# Patient Record
Sex: MALE | Race: BLACK OR AFRICAN AMERICAN | NOT HISPANIC OR LATINO
[De-identification: names, ages, dates, MRNs, and addresses within clinical notes are randomized per-mention and may not be internally consistent; named-entity substitution may affect disease eponyms.]

---

## 2017-01-03 PROBLEM — Z00.00 ENCOUNTER FOR PREVENTIVE HEALTH EXAMINATION: Status: ACTIVE | Noted: 2017-01-03

## 2017-02-06 ENCOUNTER — APPOINTMENT (OUTPATIENT)
Dept: OPHTHALMOLOGY | Facility: CLINIC | Age: 52
End: 2017-02-06

## 2017-02-06 DIAGNOSIS — H20.043 SECONDARY NONINFECTIOUS IRIDOCYCLITIS, BILATERAL: ICD-10-CM

## 2017-02-06 DIAGNOSIS — D86.89 SARCOIDOSIS OF OTHER SITES: ICD-10-CM

## 2017-11-13 ENCOUNTER — APPOINTMENT (OUTPATIENT)
Dept: OPHTHALMOLOGY | Facility: CLINIC | Age: 52
End: 2017-11-13
Payer: COMMERCIAL

## 2017-11-13 PROCEDURE — 92014 COMPRE OPH EXAM EST PT 1/>: CPT

## 2017-11-13 PROCEDURE — 76512 OPH US DX B-SCAN: CPT | Mod: RT

## 2017-11-13 PROCEDURE — 92134 CPTRZ OPH DX IMG PST SGM RTA: CPT

## 2017-11-13 RX ORDER — PREDNISOLONE ACETATE 10 MG/ML
1 SUSPENSION/ DROPS OPHTHALMIC
Qty: 1 | Refills: 6 | Status: ACTIVE | COMMUNITY
Start: 2017-11-13 | End: 1900-01-01

## 2017-11-22 ENCOUNTER — APPOINTMENT (OUTPATIENT)
Dept: OPHTHALMOLOGY | Facility: CLINIC | Age: 52
End: 2017-11-22
Payer: COMMERCIAL

## 2017-11-22 PROCEDURE — 76514 ECHO EXAM OF EYE THICKNESS: CPT

## 2017-11-22 PROCEDURE — 92012 INTRM OPH EXAM EST PATIENT: CPT

## 2017-11-22 RX ORDER — DORZOLAMIDE HYDROCHLORIDE TIMOLOL MALEATE 20; 5 MG/ML; MG/ML
22.3-6.8 SOLUTION/ DROPS OPHTHALMIC
Qty: 3 | Refills: 6 | Status: ACTIVE | COMMUNITY
Start: 2017-11-22 | End: 1900-01-01

## 2017-12-11 ENCOUNTER — APPOINTMENT (OUTPATIENT)
Dept: OPHTHALMOLOGY | Facility: CLINIC | Age: 52
End: 2017-12-11
Payer: COMMERCIAL

## 2017-12-11 PROCEDURE — 92012 INTRM OPH EXAM EST PATIENT: CPT

## 2018-02-12 ENCOUNTER — APPOINTMENT (OUTPATIENT)
Dept: OPHTHALMOLOGY | Facility: CLINIC | Age: 53
End: 2018-02-12

## 2018-04-30 ENCOUNTER — APPOINTMENT (OUTPATIENT)
Dept: OPHTHALMOLOGY | Facility: CLINIC | Age: 53
End: 2018-04-30
Payer: COMMERCIAL

## 2018-04-30 PROCEDURE — 92014 COMPRE OPH EXAM EST PT 1/>: CPT

## 2018-04-30 PROCEDURE — 92133 CPTRZD OPH DX IMG PST SGM ON: CPT

## 2018-04-30 RX ORDER — PREDNISOLONE ACETATE 10 MG/ML
1 SUSPENSION/ DROPS OPHTHALMIC
Qty: 1 | Refills: 4 | Status: ACTIVE | COMMUNITY
Start: 2018-04-30 | End: 1900-01-01

## 2018-05-08 ENCOUNTER — MEDICATION RENEWAL (OUTPATIENT)
Age: 53
End: 2018-05-08

## 2018-06-28 ENCOUNTER — RX RENEWAL (OUTPATIENT)
Age: 53
End: 2018-06-28

## 2018-08-13 ENCOUNTER — MEDICATION RENEWAL (OUTPATIENT)
Age: 53
End: 2018-08-13

## 2018-08-13 RX ORDER — PREDNISOLONE ACETATE 10 MG/ML
1 SUSPENSION/ DROPS OPHTHALMIC
Qty: 1 | Refills: 0 | Status: ACTIVE | COMMUNITY
Start: 2017-12-11 | End: 1900-01-01

## 2018-08-17 ENCOUNTER — APPOINTMENT (OUTPATIENT)
Dept: OPHTHALMOLOGY | Facility: CLINIC | Age: 53
End: 2018-08-17
Payer: COMMERCIAL

## 2018-08-17 DIAGNOSIS — H40.43X2 GLAUCOMA SECONDARY TO EYE INFLAMMATION, BILATERAL, MODERATE STAGE: ICD-10-CM

## 2018-08-17 PROCEDURE — 92134 CPTRZ OPH DX IMG PST SGM RTA: CPT

## 2018-08-17 PROCEDURE — 92014 COMPRE OPH EXAM EST PT 1/>: CPT

## 2018-08-17 PROCEDURE — 92083 EXTENDED VISUAL FIELD XM: CPT

## 2018-12-03 ENCOUNTER — RX RENEWAL (OUTPATIENT)
Age: 53
End: 2018-12-03

## 2018-12-07 ENCOUNTER — RX RENEWAL (OUTPATIENT)
Age: 53
End: 2018-12-07

## 2018-12-07 RX ORDER — BRIMONIDINE TARTRATE 1.5 MG/ML
0.15 SOLUTION/ DROPS OPHTHALMIC
Qty: 20 | Refills: 6 | Status: ACTIVE | COMMUNITY
Start: 2018-04-30 | End: 1900-01-01

## 2018-12-07 RX ORDER — BRIMONIDINE TARTRATE 1.5 MG/ML
0.15 SOLUTION/ DROPS OPHTHALMIC
Qty: 10 | Refills: 0 | Status: ACTIVE | COMMUNITY
Start: 2017-11-13 | End: 1900-01-01

## 2019-04-08 ENCOUNTER — APPOINTMENT (OUTPATIENT)
Dept: OPHTHALMOLOGY | Facility: CLINIC | Age: 54
End: 2019-04-08
Payer: COMMERCIAL

## 2019-04-08 DIAGNOSIS — H43.811 VITREOUS DEGENERATION, RIGHT EYE: ICD-10-CM

## 2019-04-08 DIAGNOSIS — D86.9 SARCOIDOSIS, UNSPECIFIED: ICD-10-CM

## 2019-04-08 DIAGNOSIS — H20.13 CHRONIC IRIDOCYCLITIS, BILATERAL: ICD-10-CM

## 2019-04-08 DIAGNOSIS — H21.543 POSTERIOR SYNECHIAE (IRIS), BILATERAL: ICD-10-CM

## 2019-04-08 DIAGNOSIS — H26.9 UNSPECIFIED CATARACT: ICD-10-CM

## 2019-04-08 PROCEDURE — 92134 CPTRZ OPH DX IMG PST SGM RTA: CPT

## 2019-04-08 PROCEDURE — 92012 INTRM OPH EXAM EST PATIENT: CPT

## 2019-04-08 RX ORDER — PREDNISOLONE ACETATE 10 MG/ML
1 SUSPENSION/ DROPS OPHTHALMIC
Qty: 1 | Refills: 3 | Status: ACTIVE | COMMUNITY
Start: 2019-04-08 | End: 1900-01-01

## 2019-04-14 ENCOUNTER — RX RENEWAL (OUTPATIENT)
Age: 54
End: 2019-04-14

## 2019-04-14 RX ORDER — DORZOLAMIDE HYDROCHLORIDE TIMOLOL MALEATE 20; 5 MG/ML; MG/ML
22.3-6.8 SOLUTION/ DROPS OPHTHALMIC
Qty: 1 | Refills: 5 | Status: ACTIVE | COMMUNITY
Start: 2018-04-30 | End: 1900-01-01

## 2019-11-13 ENCOUNTER — APPOINTMENT (OUTPATIENT)
Dept: OPHTHALMOLOGY | Facility: CLINIC | Age: 54
End: 2019-11-13
Payer: COMMERCIAL

## 2019-11-13 ENCOUNTER — NON-APPOINTMENT (OUTPATIENT)
Age: 54
End: 2019-11-13

## 2019-11-13 PROCEDURE — 92134 CPTRZ OPH DX IMG PST SGM RTA: CPT

## 2019-11-13 PROCEDURE — 92014 COMPRE OPH EXAM EST PT 1/>: CPT

## 2019-11-13 PROCEDURE — 92136 OPHTHALMIC BIOMETRY: CPT | Mod: RT

## 2020-01-02 ENCOUNTER — APPOINTMENT (OUTPATIENT)
Dept: OPHTHALMOLOGY | Facility: CLINIC | Age: 55
End: 2020-01-02
Payer: COMMERCIAL

## 2020-01-02 ENCOUNTER — NON-APPOINTMENT (OUTPATIENT)
Age: 55
End: 2020-01-02

## 2020-01-02 PROCEDURE — 92014 COMPRE OPH EXAM EST PT 1/>: CPT

## 2021-02-10 ENCOUNTER — APPOINTMENT (OUTPATIENT)
Dept: OPHTHALMOLOGY | Facility: CLINIC | Age: 56
End: 2021-02-10
Payer: COMMERCIAL

## 2021-02-10 ENCOUNTER — NON-APPOINTMENT (OUTPATIENT)
Age: 56
End: 2021-02-10

## 2021-02-10 PROCEDURE — 92014 COMPRE OPH EXAM EST PT 1/>: CPT

## 2021-02-10 PROCEDURE — 99072 ADDL SUPL MATRL&STAF TM PHE: CPT

## 2021-02-10 PROCEDURE — 92133 CPTRZD OPH DX IMG PST SGM ON: CPT

## 2021-02-11 ENCOUNTER — NON-APPOINTMENT (OUTPATIENT)
Age: 56
End: 2021-02-11

## 2021-02-11 ENCOUNTER — APPOINTMENT (OUTPATIENT)
Dept: OPHTHALMOLOGY | Facility: CLINIC | Age: 56
End: 2021-02-11
Payer: COMMERCIAL

## 2021-02-11 PROCEDURE — 92083 EXTENDED VISUAL FIELD XM: CPT

## 2021-02-11 PROCEDURE — 92020 GONIOSCOPY: CPT

## 2021-02-11 PROCEDURE — 92012 INTRM OPH EXAM EST PATIENT: CPT

## 2021-02-11 PROCEDURE — 99072 ADDL SUPL MATRL&STAF TM PHE: CPT

## 2021-02-17 ENCOUNTER — NON-APPOINTMENT (OUTPATIENT)
Age: 56
End: 2021-02-17

## 2021-02-17 ENCOUNTER — APPOINTMENT (OUTPATIENT)
Dept: OPHTHALMOLOGY | Facility: CLINIC | Age: 56
End: 2021-02-17
Payer: COMMERCIAL

## 2021-02-17 PROCEDURE — 66761 REVISION OF IRIS: CPT | Mod: RT

## 2021-02-17 PROCEDURE — 99072 ADDL SUPL MATRL&STAF TM PHE: CPT

## 2021-02-18 ENCOUNTER — APPOINTMENT (OUTPATIENT)
Dept: OPHTHALMOLOGY | Facility: CLINIC | Age: 56
End: 2021-02-18
Payer: COMMERCIAL

## 2021-02-18 ENCOUNTER — NON-APPOINTMENT (OUTPATIENT)
Age: 56
End: 2021-02-18

## 2021-02-18 PROCEDURE — 92014 COMPRE OPH EXAM EST PT 1/>: CPT | Mod: 24

## 2021-02-18 PROCEDURE — 76512 OPH US DX B-SCAN: CPT | Mod: RT

## 2021-02-18 PROCEDURE — 92134 CPTRZ OPH DX IMG PST SGM RTA: CPT

## 2021-02-18 PROCEDURE — 99072 ADDL SUPL MATRL&STAF TM PHE: CPT

## 2021-02-24 ENCOUNTER — NON-APPOINTMENT (OUTPATIENT)
Age: 56
End: 2021-02-24

## 2021-02-24 ENCOUNTER — APPOINTMENT (OUTPATIENT)
Dept: OPHTHALMOLOGY | Facility: CLINIC | Age: 56
End: 2021-02-24
Payer: COMMERCIAL

## 2021-02-24 PROCEDURE — 99072 ADDL SUPL MATRL&STAF TM PHE: CPT

## 2021-02-24 PROCEDURE — 92012 INTRM OPH EXAM EST PATIENT: CPT | Mod: 24

## 2021-03-03 ENCOUNTER — APPOINTMENT (OUTPATIENT)
Dept: OPHTHALMOLOGY | Facility: CLINIC | Age: 56
End: 2021-03-03
Payer: COMMERCIAL

## 2021-03-03 ENCOUNTER — NON-APPOINTMENT (OUTPATIENT)
Age: 56
End: 2021-03-03

## 2021-03-03 PROCEDURE — 92012 INTRM OPH EXAM EST PATIENT: CPT

## 2021-03-03 PROCEDURE — 99072 ADDL SUPL MATRL&STAF TM PHE: CPT

## 2021-03-08 DIAGNOSIS — Z01.818 ENCOUNTER FOR OTHER PREPROCEDURAL EXAMINATION: ICD-10-CM

## 2021-03-11 ENCOUNTER — APPOINTMENT (OUTPATIENT)
Dept: DISASTER EMERGENCY | Facility: CLINIC | Age: 56
End: 2021-03-11

## 2021-03-12 LAB — SARS-COV-2 N GENE NPH QL NAA+PROBE: NOT DETECTED

## 2021-03-15 ENCOUNTER — APPOINTMENT (OUTPATIENT)
Dept: OPHTHALMOLOGY | Facility: AMBULATORY SURGERY CENTER | Age: 56
End: 2021-03-15
Payer: COMMERCIAL

## 2021-03-15 ENCOUNTER — NON-APPOINTMENT (OUTPATIENT)
Age: 56
End: 2021-03-15

## 2021-03-15 PROCEDURE — 66982 XCAPSL CTRC RMVL CPLX WO ECP: CPT | Mod: LT

## 2021-03-15 PROCEDURE — 66180 AQUEOUS SHUNT EYE W/GRAFT: CPT | Mod: LT

## 2021-03-16 ENCOUNTER — APPOINTMENT (OUTPATIENT)
Dept: OPHTHALMOLOGY | Facility: CLINIC | Age: 56
End: 2021-03-16
Payer: COMMERCIAL

## 2021-03-16 ENCOUNTER — NON-APPOINTMENT (OUTPATIENT)
Age: 56
End: 2021-03-16

## 2021-03-16 PROCEDURE — 99024 POSTOP FOLLOW-UP VISIT: CPT

## 2021-03-18 ENCOUNTER — NON-APPOINTMENT (OUTPATIENT)
Age: 56
End: 2021-03-18

## 2021-03-18 ENCOUNTER — APPOINTMENT (OUTPATIENT)
Dept: OPHTHALMOLOGY | Facility: CLINIC | Age: 56
End: 2021-03-18
Payer: COMMERCIAL

## 2021-03-18 PROCEDURE — 99024 POSTOP FOLLOW-UP VISIT: CPT

## 2021-03-23 ENCOUNTER — NON-APPOINTMENT (OUTPATIENT)
Age: 56
End: 2021-03-23

## 2021-03-23 ENCOUNTER — APPOINTMENT (OUTPATIENT)
Dept: OPHTHALMOLOGY | Facility: CLINIC | Age: 56
End: 2021-03-23
Payer: COMMERCIAL

## 2021-03-23 PROCEDURE — 99024 POSTOP FOLLOW-UP VISIT: CPT

## 2021-04-01 ENCOUNTER — NON-APPOINTMENT (OUTPATIENT)
Age: 56
End: 2021-04-01

## 2021-04-01 ENCOUNTER — APPOINTMENT (OUTPATIENT)
Dept: OPHTHALMOLOGY | Facility: CLINIC | Age: 56
End: 2021-04-01
Payer: COMMERCIAL

## 2021-04-01 PROCEDURE — 99024 POSTOP FOLLOW-UP VISIT: CPT

## 2021-04-08 ENCOUNTER — APPOINTMENT (OUTPATIENT)
Dept: OPHTHALMOLOGY | Facility: CLINIC | Age: 56
End: 2021-04-08
Payer: COMMERCIAL

## 2021-04-08 ENCOUNTER — NON-APPOINTMENT (OUTPATIENT)
Age: 56
End: 2021-04-08

## 2021-04-08 PROCEDURE — 99024 POSTOP FOLLOW-UP VISIT: CPT

## 2021-04-15 ENCOUNTER — APPOINTMENT (OUTPATIENT)
Dept: OPHTHALMOLOGY | Facility: CLINIC | Age: 56
End: 2021-04-15
Payer: COMMERCIAL

## 2021-04-15 ENCOUNTER — NON-APPOINTMENT (OUTPATIENT)
Age: 56
End: 2021-04-15

## 2021-04-15 PROCEDURE — 92134 CPTRZ OPH DX IMG PST SGM RTA: CPT

## 2021-04-15 PROCEDURE — 99024 POSTOP FOLLOW-UP VISIT: CPT

## 2021-04-21 ENCOUNTER — APPOINTMENT (OUTPATIENT)
Dept: OPHTHALMOLOGY | Facility: CLINIC | Age: 56
End: 2021-04-21
Payer: COMMERCIAL

## 2021-04-21 ENCOUNTER — NON-APPOINTMENT (OUTPATIENT)
Age: 56
End: 2021-04-21

## 2021-04-21 PROCEDURE — 76512 OPH US DX B-SCAN: CPT | Mod: RT

## 2021-04-21 PROCEDURE — 99024 POSTOP FOLLOW-UP VISIT: CPT

## 2021-04-23 ENCOUNTER — NON-APPOINTMENT (OUTPATIENT)
Age: 56
End: 2021-04-23

## 2021-04-23 ENCOUNTER — APPOINTMENT (OUTPATIENT)
Dept: OPHTHALMOLOGY | Facility: CLINIC | Age: 56
End: 2021-04-23
Payer: COMMERCIAL

## 2021-04-23 PROCEDURE — 99024 POSTOP FOLLOW-UP VISIT: CPT

## 2021-04-23 PROCEDURE — 76512 OPH US DX B-SCAN: CPT | Mod: LT

## 2021-04-24 ENCOUNTER — NON-APPOINTMENT (OUTPATIENT)
Age: 56
End: 2021-04-24

## 2021-04-24 ENCOUNTER — APPOINTMENT (OUTPATIENT)
Dept: OPHTHALMOLOGY | Facility: CLINIC | Age: 56
End: 2021-04-24
Payer: COMMERCIAL

## 2021-04-24 PROCEDURE — 99024 POSTOP FOLLOW-UP VISIT: CPT

## 2021-04-26 ENCOUNTER — APPOINTMENT (OUTPATIENT)
Dept: OPHTHALMOLOGY | Facility: CLINIC | Age: 56
End: 2021-04-26

## 2021-04-27 ENCOUNTER — NON-APPOINTMENT (OUTPATIENT)
Age: 56
End: 2021-04-27

## 2021-04-27 ENCOUNTER — APPOINTMENT (OUTPATIENT)
Dept: OPHTHALMOLOGY | Facility: CLINIC | Age: 56
End: 2021-04-27
Payer: COMMERCIAL

## 2021-04-27 PROCEDURE — 92134 CPTRZ OPH DX IMG PST SGM RTA: CPT

## 2021-04-27 PROCEDURE — 66030 INJECTION TREATMENT OF EYE: CPT | Mod: LT,58

## 2021-04-27 PROCEDURE — 99024 POSTOP FOLLOW-UP VISIT: CPT

## 2021-04-29 ENCOUNTER — APPOINTMENT (OUTPATIENT)
Dept: OPHTHALMOLOGY | Facility: CLINIC | Age: 56
End: 2021-04-29
Payer: COMMERCIAL

## 2021-04-29 ENCOUNTER — APPOINTMENT (OUTPATIENT)
Dept: OPHTHALMOLOGY | Facility: CLINIC | Age: 56
End: 2021-04-29

## 2021-04-29 ENCOUNTER — NON-APPOINTMENT (OUTPATIENT)
Age: 56
End: 2021-04-29

## 2021-04-29 ENCOUNTER — APPOINTMENT (OUTPATIENT)
Dept: DISASTER EMERGENCY | Facility: CLINIC | Age: 56
End: 2021-04-29

## 2021-04-29 PROCEDURE — 99024 POSTOP FOLLOW-UP VISIT: CPT

## 2021-04-30 LAB — SARS-COV-2 N GENE NPH QL NAA+PROBE: NOT DETECTED

## 2021-05-03 ENCOUNTER — APPOINTMENT (OUTPATIENT)
Dept: OPHTHALMOLOGY | Facility: AMBULATORY SURGERY CENTER | Age: 56
End: 2021-05-03
Payer: COMMERCIAL

## 2021-05-03 PROCEDURE — 66185 REVISE AQUEOUS SHUNT EYE: CPT | Mod: LT,79

## 2021-05-04 ENCOUNTER — APPOINTMENT (OUTPATIENT)
Dept: OPHTHALMOLOGY | Facility: CLINIC | Age: 56
End: 2021-05-04
Payer: COMMERCIAL

## 2021-05-04 ENCOUNTER — NON-APPOINTMENT (OUTPATIENT)
Age: 56
End: 2021-05-04

## 2021-05-04 PROCEDURE — 66030 INJECTION TREATMENT OF EYE: CPT | Mod: 58,LT

## 2021-05-04 PROCEDURE — 99024 POSTOP FOLLOW-UP VISIT: CPT

## 2021-05-06 ENCOUNTER — NON-APPOINTMENT (OUTPATIENT)
Age: 56
End: 2021-05-06

## 2021-05-06 ENCOUNTER — APPOINTMENT (OUTPATIENT)
Dept: OPHTHALMOLOGY | Facility: CLINIC | Age: 56
End: 2021-05-06
Payer: COMMERCIAL

## 2021-05-06 PROCEDURE — 76512 OPH US DX B-SCAN: CPT | Mod: LT

## 2021-05-06 PROCEDURE — 66030 INJECTION TREATMENT OF EYE: CPT | Mod: 58,LT

## 2021-05-06 PROCEDURE — 99024 POSTOP FOLLOW-UP VISIT: CPT

## 2021-05-11 ENCOUNTER — APPOINTMENT (OUTPATIENT)
Dept: OPHTHALMOLOGY | Facility: CLINIC | Age: 56
End: 2021-05-11
Payer: COMMERCIAL

## 2021-05-11 ENCOUNTER — NON-APPOINTMENT (OUTPATIENT)
Age: 56
End: 2021-05-11

## 2021-05-11 PROCEDURE — 99024 POSTOP FOLLOW-UP VISIT: CPT

## 2021-05-11 PROCEDURE — 66030 INJECTION TREATMENT OF EYE: CPT | Mod: 58,LT

## 2021-05-11 PROCEDURE — 76512 OPH US DX B-SCAN: CPT | Mod: LT

## 2021-05-13 ENCOUNTER — APPOINTMENT (OUTPATIENT)
Dept: OPHTHALMOLOGY | Facility: CLINIC | Age: 56
End: 2021-05-13
Payer: COMMERCIAL

## 2021-05-13 ENCOUNTER — NON-APPOINTMENT (OUTPATIENT)
Age: 56
End: 2021-05-13

## 2021-05-13 PROCEDURE — 99024 POSTOP FOLLOW-UP VISIT: CPT

## 2021-05-17 ENCOUNTER — APPOINTMENT (OUTPATIENT)
Dept: OPHTHALMOLOGY | Facility: CLINIC | Age: 56
End: 2021-05-17
Payer: COMMERCIAL

## 2021-05-17 ENCOUNTER — NON-APPOINTMENT (OUTPATIENT)
Age: 56
End: 2021-05-17

## 2021-05-17 PROCEDURE — 99024 POSTOP FOLLOW-UP VISIT: CPT

## 2021-05-19 ENCOUNTER — NON-APPOINTMENT (OUTPATIENT)
Age: 56
End: 2021-05-19

## 2021-05-19 ENCOUNTER — APPOINTMENT (OUTPATIENT)
Dept: OPHTHALMOLOGY | Facility: CLINIC | Age: 56
End: 2021-05-19
Payer: COMMERCIAL

## 2021-05-19 PROCEDURE — 99024 POSTOP FOLLOW-UP VISIT: CPT

## 2021-05-20 DIAGNOSIS — Z01.818 ENCOUNTER FOR OTHER PREPROCEDURAL EXAMINATION: ICD-10-CM

## 2021-05-21 ENCOUNTER — APPOINTMENT (OUTPATIENT)
Dept: DISASTER EMERGENCY | Facility: CLINIC | Age: 56
End: 2021-05-21

## 2021-05-22 LAB — SARS-COV-2 N GENE NPH QL NAA+PROBE: NOT DETECTED

## 2021-05-24 ENCOUNTER — APPOINTMENT (OUTPATIENT)
Dept: OPHTHALMOLOGY | Facility: CLINIC | Age: 56
End: 2021-05-24
Payer: COMMERCIAL

## 2021-05-24 ENCOUNTER — NON-APPOINTMENT (OUTPATIENT)
Age: 56
End: 2021-05-24

## 2021-05-24 PROCEDURE — 92134 CPTRZ OPH DX IMG PST SGM RTA: CPT

## 2021-05-24 PROCEDURE — 99072 ADDL SUPL MATRL&STAF TM PHE: CPT

## 2021-05-24 PROCEDURE — 92012 INTRM OPH EXAM EST PATIENT: CPT | Mod: 24

## 2021-05-26 ENCOUNTER — APPOINTMENT (OUTPATIENT)
Dept: OPHTHALMOLOGY | Facility: AMBULATORY SURGERY CENTER | Age: 56
End: 2021-05-26
Payer: COMMERCIAL

## 2021-05-26 PROCEDURE — 66185 REVISE AQUEOUS SHUNT EYE: CPT | Mod: LT,79

## 2021-05-27 ENCOUNTER — APPOINTMENT (OUTPATIENT)
Dept: OPHTHALMOLOGY | Facility: CLINIC | Age: 56
End: 2021-05-27
Payer: COMMERCIAL

## 2021-05-27 ENCOUNTER — NON-APPOINTMENT (OUTPATIENT)
Age: 56
End: 2021-05-27

## 2021-05-27 PROCEDURE — 76512 OPH US DX B-SCAN: CPT | Mod: RT

## 2021-05-27 PROCEDURE — 99024 POSTOP FOLLOW-UP VISIT: CPT

## 2021-06-03 ENCOUNTER — APPOINTMENT (OUTPATIENT)
Dept: OPHTHALMOLOGY | Facility: CLINIC | Age: 56
End: 2021-06-03
Payer: COMMERCIAL

## 2021-06-03 ENCOUNTER — NON-APPOINTMENT (OUTPATIENT)
Age: 56
End: 2021-06-03

## 2021-06-03 PROCEDURE — 76512 OPH US DX B-SCAN: CPT | Mod: LT

## 2021-06-03 PROCEDURE — 99024 POSTOP FOLLOW-UP VISIT: CPT

## 2021-06-17 ENCOUNTER — NON-APPOINTMENT (OUTPATIENT)
Age: 56
End: 2021-06-17

## 2021-06-17 ENCOUNTER — APPOINTMENT (OUTPATIENT)
Dept: OPHTHALMOLOGY | Facility: CLINIC | Age: 56
End: 2021-06-17
Payer: COMMERCIAL

## 2021-06-17 PROCEDURE — 99024 POSTOP FOLLOW-UP VISIT: CPT

## 2021-06-23 ENCOUNTER — APPOINTMENT (OUTPATIENT)
Dept: OPHTHALMOLOGY | Facility: CLINIC | Age: 56
End: 2021-06-23
Payer: COMMERCIAL

## 2021-06-23 ENCOUNTER — NON-APPOINTMENT (OUTPATIENT)
Age: 56
End: 2021-06-23

## 2021-06-23 PROCEDURE — 92012 INTRM OPH EXAM EST PATIENT: CPT | Mod: 24

## 2021-06-23 PROCEDURE — 99072 ADDL SUPL MATRL&STAF TM PHE: CPT

## 2021-06-28 ENCOUNTER — APPOINTMENT (OUTPATIENT)
Dept: OPHTHALMOLOGY | Facility: CLINIC | Age: 56
End: 2021-06-28
Payer: COMMERCIAL

## 2021-06-28 ENCOUNTER — NON-APPOINTMENT (OUTPATIENT)
Age: 56
End: 2021-06-28

## 2021-06-28 PROCEDURE — 92134 CPTRZ OPH DX IMG PST SGM RTA: CPT

## 2021-06-28 PROCEDURE — 92012 INTRM OPH EXAM EST PATIENT: CPT | Mod: 24

## 2021-06-28 PROCEDURE — 99072 ADDL SUPL MATRL&STAF TM PHE: CPT

## 2021-06-29 ENCOUNTER — NON-APPOINTMENT (OUTPATIENT)
Age: 56
End: 2021-06-29

## 2021-06-29 ENCOUNTER — APPOINTMENT (OUTPATIENT)
Dept: OPHTHALMOLOGY | Facility: CLINIC | Age: 56
End: 2021-06-29
Payer: COMMERCIAL

## 2021-06-29 PROCEDURE — 99024 POSTOP FOLLOW-UP VISIT: CPT

## 2021-07-15 ENCOUNTER — NON-APPOINTMENT (OUTPATIENT)
Age: 56
End: 2021-07-15

## 2021-07-15 ENCOUNTER — APPOINTMENT (OUTPATIENT)
Dept: OPHTHALMOLOGY | Facility: CLINIC | Age: 56
End: 2021-07-15
Payer: COMMERCIAL

## 2021-07-15 PROCEDURE — 99024 POSTOP FOLLOW-UP VISIT: CPT

## 2021-08-20 ENCOUNTER — APPOINTMENT (OUTPATIENT)
Dept: OPHTHALMOLOGY | Facility: CLINIC | Age: 56
End: 2021-08-20
Payer: COMMERCIAL

## 2021-08-20 ENCOUNTER — NON-APPOINTMENT (OUTPATIENT)
Age: 56
End: 2021-08-20

## 2021-08-20 PROCEDURE — 92134 CPTRZ OPH DX IMG PST SGM RTA: CPT

## 2021-08-20 PROCEDURE — 92014 COMPRE OPH EXAM EST PT 1/>: CPT | Mod: 24

## 2021-09-28 ENCOUNTER — NON-APPOINTMENT (OUTPATIENT)
Age: 56
End: 2021-09-28

## 2021-09-28 ENCOUNTER — APPOINTMENT (OUTPATIENT)
Dept: OPHTHALMOLOGY | Facility: CLINIC | Age: 56
End: 2021-09-28
Payer: COMMERCIAL

## 2021-09-28 PROCEDURE — 92012 INTRM OPH EXAM EST PATIENT: CPT

## 2021-10-05 ENCOUNTER — APPOINTMENT (OUTPATIENT)
Dept: OPHTHALMOLOGY | Facility: CLINIC | Age: 56
End: 2021-10-05
Payer: COMMERCIAL

## 2021-10-05 ENCOUNTER — NON-APPOINTMENT (OUTPATIENT)
Age: 56
End: 2021-10-05

## 2021-10-05 PROCEDURE — 92012 INTRM OPH EXAM EST PATIENT: CPT

## 2021-10-25 ENCOUNTER — APPOINTMENT (OUTPATIENT)
Dept: OPHTHALMOLOGY | Facility: CLINIC | Age: 56
End: 2021-10-25
Payer: COMMERCIAL

## 2021-10-25 ENCOUNTER — NON-APPOINTMENT (OUTPATIENT)
Age: 56
End: 2021-10-25

## 2021-10-25 PROCEDURE — 92014 COMPRE OPH EXAM EST PT 1/>: CPT

## 2021-10-25 PROCEDURE — 92134 CPTRZ OPH DX IMG PST SGM RTA: CPT

## 2021-11-01 ENCOUNTER — APPOINTMENT (OUTPATIENT)
Dept: OPHTHALMOLOGY | Facility: CLINIC | Age: 56
End: 2021-11-01

## 2021-11-30 ENCOUNTER — APPOINTMENT (OUTPATIENT)
Dept: OPHTHALMOLOGY | Facility: CLINIC | Age: 56
End: 2021-11-30

## 2021-12-22 ENCOUNTER — NON-APPOINTMENT (OUTPATIENT)
Age: 56
End: 2021-12-22

## 2021-12-22 ENCOUNTER — APPOINTMENT (OUTPATIENT)
Dept: OPHTHALMOLOGY | Facility: CLINIC | Age: 56
End: 2021-12-22
Payer: COMMERCIAL

## 2021-12-22 PROCEDURE — 92012 INTRM OPH EXAM EST PATIENT: CPT

## 2022-01-06 ENCOUNTER — APPOINTMENT (OUTPATIENT)
Dept: UROLOGY | Facility: CLINIC | Age: 57
End: 2022-01-06
Payer: COMMERCIAL

## 2022-01-06 VITALS
HEART RATE: 87 BPM | OXYGEN SATURATION: 97 % | WEIGHT: 192 LBS | BODY MASS INDEX: 29.1 KG/M2 | RESPIRATION RATE: 14 BRPM | DIASTOLIC BLOOD PRESSURE: 84 MMHG | HEIGHT: 68 IN | SYSTOLIC BLOOD PRESSURE: 146 MMHG | TEMPERATURE: 97.5 F

## 2022-01-06 DIAGNOSIS — Z86.39 PERSONAL HISTORY OF OTHER ENDOCRINE, NUTRITIONAL AND METABOLIC DISEASE: ICD-10-CM

## 2022-01-06 DIAGNOSIS — Z87.891 PERSONAL HISTORY OF NICOTINE DEPENDENCE: ICD-10-CM

## 2022-01-06 DIAGNOSIS — Z80.42 FAMILY HISTORY OF MALIGNANT NEOPLASM OF PROSTATE: ICD-10-CM

## 2022-01-06 PROCEDURE — 99204 OFFICE O/P NEW MOD 45 MIN: CPT

## 2022-01-06 RX ORDER — LOSARTAN POTASSIUM 100 MG/1
TABLET, FILM COATED ORAL
Refills: 0 | Status: ACTIVE | COMMUNITY

## 2022-01-06 RX ORDER — GLIPIZIDE 2.5 MG/1
TABLET ORAL
Refills: 0 | Status: ACTIVE | COMMUNITY

## 2022-01-06 RX ORDER — SILDENAFIL 20 MG/1
20 TABLET ORAL
Qty: 90 | Refills: 2 | Status: ACTIVE | COMMUNITY
Start: 2022-01-06 | End: 1900-01-01

## 2022-01-06 RX ORDER — METFORMIN HYDROCHLORIDE 625 MG/1
TABLET ORAL
Refills: 0 | Status: ACTIVE | COMMUNITY

## 2022-01-06 RX ORDER — AMLODIPINE BESYLATE 5 MG/1
TABLET ORAL
Refills: 0 | Status: ACTIVE | COMMUNITY

## 2022-01-06 NOTE — PHYSICAL EXAM
[General Appearance - Well Developed] : well developed [General Appearance - Well Nourished] : well nourished [Normal Appearance] : normal appearance [Well Groomed] : well groomed [General Appearance - In No Acute Distress] : no acute distress [Edema] : no peripheral edema [Respiration, Rhythm And Depth] : normal respiratory rhythm and effort [Exaggerated Use Of Accessory Muscles For Inspiration] : no accessory muscle use [Abdomen Soft] : soft [Abdomen Tenderness] : non-tender [Costovertebral Angle Tenderness] : no ~M costovertebral angle tenderness [Urethral Meatus] : meatus normal [Penis Abnormality] : normal circumcised penis [Urinary Bladder Findings] : the bladder was normal on palpation [Scrotum] : the scrotum was normal [Testes Mass (___cm)] : there were no testicular masses [Prostate Enlargement] : the prostate was not enlarged [Prostate Tenderness] : the prostate was not tender [No Prostate Nodules] : no prostate nodules [Normal Station and Gait] : the gait and station were normal for the patient's age [] : no rash [No Focal Deficits] : no focal deficits [Oriented To Time, Place, And Person] : oriented to person, place, and time [Affect] : the affect was normal [Mood] : the mood was normal [Not Anxious] : not anxious [Inguinal Lymph Nodes Enlarged Bilaterally] : inguinal

## 2022-01-06 NOTE — ASSESSMENT
[FreeTextEntry1] : He will undergo renal ultrasound for assessment for possibility of kidney stones.  He appears to have history of sarcoidosis which increases risk of kidney stones.  We discussed the underlying mechanism for erections, pathophysiology of erectile dysfunction (ED) and treatment options. Role of smoking, diabetes, hypertension, hyperlipidemia, coronary artery disease and treatment for benign and malignant prostate conditions was discussed as some of the more common causes of ED. Exercise, weight loss and a healthy lifestyle can be beneficial. We discussed testosterone (T) and its possible link to increased desire for sexual activity, feeling energetic, muscle mass, etc. Low T as a cause for ED is less clear. Mechanism by which PDE-5 inhibitors improve erections was discussed. Medications in this category include Viagra, Levitra, Staxyn, Cialis and Stendra. As needed versus daily dosing was discussed. Also, use of vacuum erection device, urethral suppository (MUSE), intracavernosal injections (Edex, Trimix, Caverject) and surgical placement of inflatable penile prosthesis were discussed in detail. Side effects of each treatment was reviewed and questions were answered.  He will be prescribed sildenafil and will check with his ophthalmologist before he starts taking it.  He will get back to me in a few months with his response.\par \par Charanjit Ridley MD, FACS\par [facility]\par  of Urology\par \par [address], [address]\[place] [address]\par \par [address], [address]\[place] [address]\par \par Tel: [phone]\par Fax: [phone]

## 2022-01-06 NOTE — REVIEW OF SYSTEMS
[Eyesight Problems] : eyesight problems [Dry Eyes] : dryness of the eyes [Poor quality erections] : Poor quality erections [No erections] : no erections [Limb Weakness] : limb weakness [Negative] : Heme/Lymph

## 2022-01-06 NOTE — HISTORY OF PRESENT ILLNESS
[FreeTextEntry1] : He is a 56-year-old man who is seen today for initial visit.  He wants to check his prostate and also interested in treatment of erectile dysfunction.  He generally does not have significant urinary symptoms.  Nocturia is once.  He has history of diabetes and hemoglobin A1c has been between 6 and 9, according to the patient.  In the last year, he has had difficulty obtaining any erections.  Prior to that his erections were slowly worsening.  He has not tried any medications for this purpose.  In 2019, PSA level was 0.2.  He is supposed to return to his primary care physician for repeat blood tests.  Also he has been complaining of right-sided flank pain for about a week.  There is no history of kidney stones.

## 2022-01-12 ENCOUNTER — APPOINTMENT (OUTPATIENT)
Dept: OPHTHALMOLOGY | Facility: CLINIC | Age: 57
End: 2022-01-12

## 2022-02-10 ENCOUNTER — APPOINTMENT (OUTPATIENT)
Dept: ENDOCRINOLOGY | Facility: CLINIC | Age: 57
End: 2022-02-10
Payer: COMMERCIAL

## 2022-02-10 VITALS — BODY MASS INDEX: 29.47 KG/M2 | WEIGHT: 193.8 LBS

## 2022-02-10 LAB
GLUCOSE BLDC GLUCOMTR-MCNC: 147
HBA1C MFR BLD HPLC: 7

## 2022-02-10 PROCEDURE — 95251 CONT GLUC MNTR ANALYSIS I&R: CPT

## 2022-02-10 PROCEDURE — G0108 DIAB MANAGE TRN  PER INDIV: CPT

## 2022-02-10 PROCEDURE — 95250 CONT GLUC MNTR PHYS/QHP EQP: CPT

## 2022-02-10 PROCEDURE — 83036 HEMOGLOBIN GLYCOSYLATED A1C: CPT | Mod: NC,QW

## 2022-02-10 PROCEDURE — 82962 GLUCOSE BLOOD TEST: CPT

## 2022-03-03 ENCOUNTER — NON-APPOINTMENT (OUTPATIENT)
Age: 57
End: 2022-03-03

## 2022-03-22 ENCOUNTER — APPOINTMENT (OUTPATIENT)
Dept: OPHTHALMOLOGY | Facility: CLINIC | Age: 57
End: 2022-03-22
Payer: COMMERCIAL

## 2022-03-22 ENCOUNTER — NON-APPOINTMENT (OUTPATIENT)
Age: 57
End: 2022-03-22

## 2022-03-22 PROCEDURE — 92012 INTRM OPH EXAM EST PATIENT: CPT

## 2022-03-22 PROCEDURE — 92083 EXTENDED VISUAL FIELD XM: CPT

## 2022-05-06 ENCOUNTER — APPOINTMENT (OUTPATIENT)
Dept: UROLOGY | Facility: CLINIC | Age: 57
End: 2022-05-06
Payer: COMMERCIAL

## 2022-05-06 VITALS
BODY MASS INDEX: 29.25 KG/M2 | WEIGHT: 193 LBS | HEIGHT: 68 IN | DIASTOLIC BLOOD PRESSURE: 91 MMHG | OXYGEN SATURATION: 97 % | TEMPERATURE: 97.9 F | SYSTOLIC BLOOD PRESSURE: 158 MMHG | RESPIRATION RATE: 14 BRPM | HEART RATE: 84 BPM

## 2022-05-06 DIAGNOSIS — Z87.898 PERSONAL HISTORY OF OTHER SPECIFIED CONDITIONS: ICD-10-CM

## 2022-05-06 DIAGNOSIS — N52.9 MALE ERECTILE DYSFUNCTION, UNSPECIFIED: ICD-10-CM

## 2022-05-06 PROCEDURE — 99213 OFFICE O/P EST LOW 20 MIN: CPT

## 2022-05-06 RX ORDER — TADALAFIL 5 MG/1
5 TABLET ORAL
Qty: 30 | Refills: 5 | Status: ACTIVE | COMMUNITY
Start: 2022-05-06 | End: 1900-01-01

## 2022-05-06 NOTE — HISTORY OF PRESENT ILLNESS
[FreeTextEntry1] : He is a 57-year-old man who is seen today for erectile dysfunction.  He tried sildenafil up to 100 mg 1 hour to several hours before and he did not see significant change in his quality of erections.  His flank pain resolved and he did not undergo renal ultrasound.  He does not have urinary symptoms.  Nocturia is 1 time.\par Previous notes:  He has history of diabetes and hemoglobin A1c has been between 6 and 9, according to the patient.  In the last year, he has had difficulty obtaining any erections.  Prior to that his erections were slowly worsening.   In 2019, PSA level was 0.2.  He is supposed to return to his primary care physician for repeat blood tests.  Also he has been complaining of right-sided flank pain for about a week.  There is no history of kidney stones.

## 2022-05-06 NOTE — PHYSICAL EXAM
[General Appearance - Well Developed] : well developed [General Appearance - Well Nourished] : well nourished [Normal Appearance] : normal appearance [Well Groomed] : well groomed [General Appearance - In No Acute Distress] : no acute distress [Abdomen Soft] : soft [Abdomen Tenderness] : non-tender [Costovertebral Angle Tenderness] : no ~M costovertebral angle tenderness [Urethral Meatus] : meatus normal [Penis Abnormality] : normal circumcised penis [Urinary Bladder Findings] : the bladder was normal on palpation [Scrotum] : the scrotum was normal [Testes Mass (___cm)] : there were no testicular masses [Prostate Enlargement] : the prostate was not enlarged [Prostate Tenderness] : the prostate was not tender [No Prostate Nodules] : no prostate nodules [FreeTextEntry1] : Prostate examined in January 2022 [] : no respiratory distress [Respiration, Rhythm And Depth] : normal respiratory rhythm and effort [Exaggerated Use Of Accessory Muscles For Inspiration] : no accessory muscle use

## 2022-05-26 ENCOUNTER — NON-APPOINTMENT (OUTPATIENT)
Age: 57
End: 2022-05-26

## 2022-05-31 ENCOUNTER — NON-APPOINTMENT (OUTPATIENT)
Age: 57
End: 2022-05-31

## 2022-05-31 ENCOUNTER — APPOINTMENT (OUTPATIENT)
Dept: OPHTHALMOLOGY | Facility: CLINIC | Age: 57
End: 2022-05-31
Payer: COMMERCIAL

## 2022-05-31 PROCEDURE — 92136 OPHTHALMIC BIOMETRY: CPT

## 2022-05-31 PROCEDURE — 92012 INTRM OPH EXAM EST PATIENT: CPT

## 2022-06-28 ENCOUNTER — APPOINTMENT (OUTPATIENT)
Dept: ENDOCRINOLOGY | Facility: CLINIC | Age: 57
End: 2022-06-28

## 2022-07-07 ENCOUNTER — APPOINTMENT (OUTPATIENT)
Dept: OPHTHALMOLOGY | Facility: CLINIC | Age: 57
End: 2022-07-07

## 2022-07-07 ENCOUNTER — NON-APPOINTMENT (OUTPATIENT)
Age: 57
End: 2022-07-07

## 2022-07-07 PROCEDURE — 76512 OPH US DX B-SCAN: CPT | Mod: RT

## 2022-07-07 PROCEDURE — 92134 CPTRZ OPH DX IMG PST SGM RTA: CPT

## 2022-07-07 PROCEDURE — 92014 COMPRE OPH EXAM EST PT 1/>: CPT

## 2022-07-18 ENCOUNTER — APPOINTMENT (OUTPATIENT)
Dept: OPHTHALMOLOGY | Facility: AMBULATORY SURGERY CENTER | Age: 57
End: 2022-07-18

## 2022-07-18 PROCEDURE — 66982 XCAPSL CTRC RMVL CPLX WO ECP: CPT | Mod: RT

## 2022-07-19 ENCOUNTER — APPOINTMENT (OUTPATIENT)
Dept: OPHTHALMOLOGY | Facility: CLINIC | Age: 57
End: 2022-07-19

## 2022-07-19 ENCOUNTER — NON-APPOINTMENT (OUTPATIENT)
Age: 57
End: 2022-07-19

## 2022-07-19 PROCEDURE — 99024 POSTOP FOLLOW-UP VISIT: CPT

## 2022-07-25 ENCOUNTER — APPOINTMENT (OUTPATIENT)
Dept: ENDOCRINOLOGY | Facility: CLINIC | Age: 57
End: 2022-07-25

## 2022-07-26 ENCOUNTER — APPOINTMENT (OUTPATIENT)
Dept: OPHTHALMOLOGY | Facility: CLINIC | Age: 57
End: 2022-07-26

## 2022-07-26 ENCOUNTER — NON-APPOINTMENT (OUTPATIENT)
Age: 57
End: 2022-07-26

## 2022-07-26 PROCEDURE — 99024 POSTOP FOLLOW-UP VISIT: CPT

## 2022-08-09 ENCOUNTER — APPOINTMENT (OUTPATIENT)
Dept: OPHTHALMOLOGY | Facility: CLINIC | Age: 57
End: 2022-08-09

## 2022-08-09 ENCOUNTER — NON-APPOINTMENT (OUTPATIENT)
Age: 57
End: 2022-08-09

## 2022-08-09 PROCEDURE — 99024 POSTOP FOLLOW-UP VISIT: CPT

## 2022-08-26 ENCOUNTER — APPOINTMENT (OUTPATIENT)
Dept: ENDOCRINOLOGY | Facility: CLINIC | Age: 57
End: 2022-08-26

## 2022-08-31 ENCOUNTER — NON-APPOINTMENT (OUTPATIENT)
Age: 57
End: 2022-08-31

## 2022-08-31 ENCOUNTER — APPOINTMENT (OUTPATIENT)
Dept: OPHTHALMOLOGY | Facility: CLINIC | Age: 57
End: 2022-08-31

## 2022-08-31 PROCEDURE — 99024 POSTOP FOLLOW-UP VISIT: CPT

## 2022-09-28 ENCOUNTER — NON-APPOINTMENT (OUTPATIENT)
Age: 57
End: 2022-09-28

## 2022-09-28 ENCOUNTER — APPOINTMENT (OUTPATIENT)
Dept: OPHTHALMOLOGY | Facility: CLINIC | Age: 57
End: 2022-09-28

## 2022-09-28 PROCEDURE — 92012 INTRM OPH EXAM EST PATIENT: CPT | Mod: 24

## 2022-12-21 ENCOUNTER — APPOINTMENT (OUTPATIENT)
Dept: OPHTHALMOLOGY | Facility: CLINIC | Age: 57
End: 2022-12-21

## 2022-12-21 ENCOUNTER — NON-APPOINTMENT (OUTPATIENT)
Age: 57
End: 2022-12-21

## 2022-12-21 PROCEDURE — 92083 EXTENDED VISUAL FIELD XM: CPT

## 2022-12-21 PROCEDURE — 92012 INTRM OPH EXAM EST PATIENT: CPT

## 2023-01-12 ENCOUNTER — APPOINTMENT (OUTPATIENT)
Dept: ENDOCRINOLOGY | Facility: CLINIC | Age: 58
End: 2023-01-12

## 2023-02-01 ENCOUNTER — NON-APPOINTMENT (OUTPATIENT)
Age: 58
End: 2023-02-01

## 2023-02-01 ENCOUNTER — APPOINTMENT (OUTPATIENT)
Dept: OPHTHALMOLOGY | Facility: CLINIC | Age: 58
End: 2023-02-01
Payer: COMMERCIAL

## 2023-02-01 PROCEDURE — 92134 CPTRZ OPH DX IMG PST SGM RTA: CPT

## 2023-02-01 PROCEDURE — 92012 INTRM OPH EXAM EST PATIENT: CPT

## 2023-02-03 ENCOUNTER — APPOINTMENT (OUTPATIENT)
Dept: ENDOCRINOLOGY | Facility: CLINIC | Age: 58
End: 2023-02-03

## 2023-03-22 ENCOUNTER — APPOINTMENT (OUTPATIENT)
Dept: OPHTHALMOLOGY | Facility: CLINIC | Age: 58
End: 2023-03-22
Payer: COMMERCIAL

## 2023-03-22 ENCOUNTER — NON-APPOINTMENT (OUTPATIENT)
Age: 58
End: 2023-03-22

## 2023-03-22 PROCEDURE — 92083 EXTENDED VISUAL FIELD XM: CPT

## 2023-03-22 PROCEDURE — 92012 INTRM OPH EXAM EST PATIENT: CPT

## 2023-04-07 ENCOUNTER — APPOINTMENT (OUTPATIENT)
Dept: ENDOCRINOLOGY | Facility: CLINIC | Age: 58
End: 2023-04-07
Payer: COMMERCIAL

## 2023-04-07 VITALS
DIASTOLIC BLOOD PRESSURE: 78 MMHG | WEIGHT: 195 LBS | HEART RATE: 84 BPM | SYSTOLIC BLOOD PRESSURE: 130 MMHG | OXYGEN SATURATION: 98 % | BODY MASS INDEX: 29.55 KG/M2 | HEIGHT: 68 IN

## 2023-04-07 LAB — HBA1C MFR BLD HPLC: 7.8

## 2023-04-07 PROCEDURE — 99205 OFFICE O/P NEW HI 60 MIN: CPT | Mod: 25

## 2023-04-07 PROCEDURE — 83036 HEMOGLOBIN GLYCOSYLATED A1C: CPT | Mod: NC,QW

## 2023-04-24 ENCOUNTER — NON-APPOINTMENT (OUTPATIENT)
Age: 58
End: 2023-04-24

## 2023-04-24 LAB
ALBUMIN SERPL ELPH-MCNC: 4.6 G/DL
ALP BLD-CCNC: 103 U/L
ALT SERPL-CCNC: 16 U/L
ANION GAP SERPL CALC-SCNC: 12 MMOL/L
AST SERPL-CCNC: 15 U/L
BILIRUB SERPL-MCNC: 0.4 MG/DL
BUN SERPL-MCNC: 11 MG/DL
C PEPTIDE SERPL-MCNC: 1.5 NG/ML
CALCIUM SERPL-MCNC: 9.2 MG/DL
CHLORIDE SERPL-SCNC: 105 MMOL/L
CHOLEST SERPL-MCNC: 210 MG/DL
CO2 SERPL-SCNC: 22 MMOL/L
CREAT SERPL-MCNC: 0.85 MG/DL
CREAT SPEC-SCNC: 66 MG/DL
EGFR: 101 ML/MIN/1.73M2
GLUCOSE SERPL-MCNC: 189 MG/DL
HDLC SERPL-MCNC: 35 MG/DL
LDLC SERPL CALC-MCNC: 106 MG/DL
MICROALBUMIN 24H UR DL<=1MG/L-MCNC: 6.4 MG/DL
MICROALBUMIN/CREAT 24H UR-RTO: 98 MG/G
NONHDLC SERPL-MCNC: 175 MG/DL
POTASSIUM SERPL-SCNC: 4 MMOL/L
PROT SERPL-MCNC: 7.4 G/DL
SODIUM SERPL-SCNC: 138 MMOL/L
TRIGL SERPL-MCNC: 344 MG/DL
TSH SERPL-ACNC: 2.15 UIU/ML
VIT B12 SERPL-MCNC: 448 PG/ML

## 2023-04-24 RX ORDER — LANCING DEVICE
EACH MISCELLANEOUS 3 TIMES DAILY
Qty: 5 | Refills: 1 | Status: ACTIVE | COMMUNITY
Start: 2023-04-24 | End: 1900-01-01

## 2023-04-24 RX ORDER — LANCING DEVICE
W/DEVICE EACH MISCELLANEOUS
Qty: 1 | Refills: 0 | Status: ACTIVE | COMMUNITY
Start: 2023-04-24 | End: 1900-01-01

## 2023-04-24 RX ORDER — CHOLECALCIFEROL (VITAMIN D3) 10(400)/ML
DROPS ORAL
Qty: 2 | Refills: 1 | Status: ACTIVE | COMMUNITY
Start: 2023-04-24 | End: 1900-01-01

## 2023-04-24 RX ORDER — ALCOHOL ANTISEPTIC PADS
PADS, MEDICATED (EA) TOPICAL
Qty: 2 | Refills: 1 | Status: ACTIVE | COMMUNITY
Start: 2023-04-24 | End: 1900-01-01

## 2023-04-24 NOTE — PHYSICAL EXAM
[Alert] : alert [No Acute Distress] : no acute distress [Normal Sclera/Conjunctiva] : normal sclera/conjunctiva [No Proptosis] : no proptosis [Thyroid Not Enlarged] : the thyroid was not enlarged [No Respiratory Distress] : no respiratory distress [No Accessory Muscle Use] : no accessory muscle use [Clear to Auscultation] : lungs were clear to auscultation bilaterally [Normal S1, S2] : normal S1 and S2 [Normal Rate] : heart rate was normal [Regular Rhythm] : with a regular rhythm [No Edema] : no peripheral edema [Normal Bowel Sounds] : normal bowel sounds [Not Tender] : non-tender [Soft] : abdomen soft [No Spinal Tenderness] : no spinal tenderness [Normal Gait] : normal gait [Normal Reflexes] : deep tendon reflexes were 2+ and symmetric [Oriented x3] : oriented to person, place, and time [Normal Affect] : the affect was normal [Normal Mood] : the mood was normal

## 2023-04-24 NOTE — ASSESSMENT
[Diabetes Foot Care] : diabetes foot care [Long Term Vascular Complications] : long term vascular complications of diabetes [Carbohydrate Consistent Diet] : carbohydrate consistent diet [Importance of Diet and Exercise] : importance of diet and exercise to improve glycemic control, achieve weight loss and improve cardiovascular health [Exercise/Effect on Glucose] : exercise/effect on glucose [Hypoglycemia Management] : hypoglycemia management [Self Monitoring of Blood Glucose] : self monitoring of blood glucose [Retinopathy Screening] : Patient was referred to ophthalmology for retinopathy screening [FreeTextEntry1] : 59 yo M with PMH of HTN, sarcoidosis, ED, chronic uveitis, glaucoma, s/p bilateral cataract surgery, h/o HLD, here to establish care for Type 2 Diabetes.\par \par Type 2 Diabetes, suboptimal control:\par - A1c 7.8% today (4/7/2023) <-- increased from 7.0% prior (2/2022).\par - Patient did not bring glucometer today, per patient recall BG somewhat variable, some fasting and prandial hyperglycemia.\par - Will switch from metformin to  mg 4 tabs daily to increase adherence to metformin.\par - Will continue glipizide ER 10 mg BID for now, though will expect to adjust dosage pending BG data.\par - Plan to start weekly GLP-1 pending BG data for further glycemic control and weight loss benefit (BMI 29.6). \par - R+B of GLP-1 discussed with the patient. Denies personal or family history of thyroid cancer or pancreatitis.\par - Previously had side effects with Farxiga, but if needed can also trial Jardiance.\par - Discussed hypoglycemia management. \par - Discussed diet modification, carb consistent diet, and exercise. \par - Continue SMBG 2-3x daily (keep glucose log/bring meter to all visits and send readings to us). Call if persistent highs or lows. Carin Pro placed today given variable BG, advised to return in 2 weeks for download.\par - F/u with ophthalmology and podiatry annually. UTD with ophtho.\par - Will check c-peptide, CMP, and vitamin B12 level.\par \par HTN:\par - Goal BP <130/80, /78 today.\par - On losartan 100 mg qd and amlodipine 10 mg qd. Continue management as per PCP.\par - eGFR 86 (10/2021). Check urine alb/cr.\par \par HLD:\par - Goal LDL <70. Last , HDL 35,  (10/2021). \par - Not currently on statin (previously on atorvastatin).\par - Will check lipid panel.\par \par F/u in 3 months with Dr. Miner.\par \par Nita William NP (Brenda)

## 2023-04-24 NOTE — HISTORY OF PRESENT ILLNESS
[FreeTextEntry1] : 57 yo M with PMH of HTN, sarcoidosis, ED, chronic uveitis, glaucoma, s/p bilateral cataract surgery, h/o HLD, here to establish care for Type 2 Diabetes.\par \par Diagnosis: T2DM diagnosed about 2015\par Previous Endo: endocrinologist in [place] ([facility]), has [facility]par Current DM Medications: metformin 1000 mg BID (increased gradually from 500 mg BID in 3/2022, tolerating well now), glipizide ER 10 mg BID (started by PCP, tolerating well)\par Past DM medications: Tradjenta (didn't sit well), Janumet (wasn't covered by insurance), pioglitazone (was told he didn't need it by previous endo), Farxiga (had stomach upset)\par Adherence: few times a week misses evening doses if working late (works 12pm-10pm, or if gets home by 2am then skips that dose)\par A1c: 7.8% today (4/7/2023) <-- 7.0% (2/2022) <-- 7.2% (10/2021).\par Not on chronic steroids. Used to be on cellcept x 2 years for sarcoidosis (Dr. Mohan).\par \par Complications: Denies h/o DKA or other hospitalizations for DM. +Erectile dysfunction.\par Macrovascular Complications: Denies CAD or CVA. \par Eyes: Last ophthalmology visit 3/2023. No diabetic retinopathy per ophtho report. Denies blurry vision. +Cataracts s/p bilateral cataract surgery (3/2021 and 6/2022). +Chronic uveitis from sarcoidosis, +glaucoma, reportedly stable.\par Feet: Last podiatry visit few years ago. +Bilateral LE neuropathy, on Lyrica and vitamin B12. Denies h/o foot ulcers or sores.\par Nephrology: eGFR 86 (10/2021). Denies h/o nephropathy. Denies h/o UTIs or genital mycotic infections.\par Occasional polyuria. Denies polydipsia. \par Weight Change: weight stable, but can fluctuate between 192-205 lbs since diagnosis of sarcoidosis\par \par SMBG (OneTouch Ultramini): checks 2x/day\par Did not bring glucometer today because he misplaced glucometer 1 week ago.\par fasting- 139, usually in mid to high 100s, sometimes fluctuates into the high 200s-low 300s, states it often depends on what he eats at night\par pre-dinner- 225, sometimes 180-190s\par Denies hypoglycemia or any hypoglycemic symptoms.\par \par Current Diet: 2 meals a day usually, started craving refined carbs again later this year\par breakfast (9-10am)- cheerios medley crunch, 2 boiled eggs, sausage, sometimes PB&J, no toast anymore\par snack- coffee with creamer, butter roll, bagel, recently started pizza again after stopping 6 months ago (eats out)\par dinner (eats dinner late 10-11pm)- soup, chicken or fried chicken, steamed vegetable, sometimes rice, no bread, rare ww pasta\par snacks- small donut, fruit (apple or orange)- fruit helped with neuropathy\par drinks- mostly water, unsweetened ice tea, stopped soda, rarely juice\par Exercise: limited, mostly active at work, would like to go back to gym\par \par HTN: On losartan 100 mg qd and amlodipine 10 mg qd.\par H/o HLD: Previously on Lipitor and another cholesterol medication, states it was stopped when lipids improved. Last , HDL 35,  (10/2021). 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 Pugh

## 2023-04-24 NOTE — REVIEW OF SYSTEMS
[As Noted in HPI] : as noted in HPI [Negative] : Heme/Lymph [Chest Pain] : no chest pain [Shortness Of Breath] : no shortness of breath [Nausea] : no nausea [Constipation] : no constipation [Abdominal Pain] : no abdominal pain [Vomiting] : no vomiting [Diarrhea] : no diarrhea [Polydipsia] : no polydipsia

## 2023-05-10 ENCOUNTER — APPOINTMENT (OUTPATIENT)
Dept: ENDOCRINOLOGY | Facility: CLINIC | Age: 58
End: 2023-05-10
Payer: COMMERCIAL

## 2023-05-10 VITALS — HEIGHT: 68 IN | WEIGHT: 200 LBS | BODY MASS INDEX: 30.31 KG/M2

## 2023-05-10 PROCEDURE — G0108 DIAB MANAGE TRN  PER INDIV: CPT

## 2023-06-05 ENCOUNTER — APPOINTMENT (OUTPATIENT)
Dept: OPHTHALMOLOGY | Facility: CLINIC | Age: 58
End: 2023-06-05

## 2023-07-03 ENCOUNTER — APPOINTMENT (OUTPATIENT)
Dept: ENDOCRINOLOGY | Facility: CLINIC | Age: 58
End: 2023-07-03

## 2023-07-05 ENCOUNTER — NON-APPOINTMENT (OUTPATIENT)
Age: 58
End: 2023-07-05

## 2023-07-21 ENCOUNTER — APPOINTMENT (OUTPATIENT)
Dept: ENDOCRINOLOGY | Facility: CLINIC | Age: 58
End: 2023-07-21

## 2023-08-02 ENCOUNTER — APPOINTMENT (OUTPATIENT)
Dept: ENDOCRINOLOGY | Facility: CLINIC | Age: 58
End: 2023-08-02

## 2023-08-03 ENCOUNTER — APPOINTMENT (OUTPATIENT)
Dept: ENDOCRINOLOGY | Facility: CLINIC | Age: 58
End: 2023-08-03
Payer: COMMERCIAL

## 2023-08-03 VITALS
DIASTOLIC BLOOD PRESSURE: 78 MMHG | HEART RATE: 94 BPM | HEIGHT: 68 IN | OXYGEN SATURATION: 98 % | SYSTOLIC BLOOD PRESSURE: 142 MMHG | WEIGHT: 186 LBS | BODY MASS INDEX: 28.19 KG/M2

## 2023-08-03 DIAGNOSIS — E11.9 TYPE 2 DIABETES MELLITUS W/OUT COMPLICATIONS: ICD-10-CM

## 2023-08-03 LAB — HBA1C MFR BLD HPLC: 6.2

## 2023-08-03 PROCEDURE — 83036 HEMOGLOBIN GLYCOSYLATED A1C: CPT | Mod: NC,QW

## 2023-08-03 PROCEDURE — 99214 OFFICE O/P EST MOD 30 MIN: CPT | Mod: 25

## 2023-08-22 ENCOUNTER — NON-APPOINTMENT (OUTPATIENT)
Age: 58
End: 2023-08-22

## 2023-08-22 ENCOUNTER — APPOINTMENT (OUTPATIENT)
Dept: OPHTHALMOLOGY | Facility: CLINIC | Age: 58
End: 2023-08-22
Payer: COMMERCIAL

## 2023-08-22 ENCOUNTER — TRANSCRIPTION ENCOUNTER (OUTPATIENT)
Age: 58
End: 2023-08-22

## 2023-08-22 PROCEDURE — 92012 INTRM OPH EXAM EST PATIENT: CPT

## 2023-09-01 ENCOUNTER — APPOINTMENT (OUTPATIENT)
Dept: ENDOCRINOLOGY | Facility: CLINIC | Age: 58
End: 2023-09-01

## 2023-09-18 PROBLEM — E11.9 DIABETES: Status: ACTIVE | Noted: 2017-11-13

## 2023-09-18 RX ORDER — BLOOD SUGAR DIAGNOSTIC
STRIP MISCELLANEOUS
Qty: 3 | Refills: 1 | Status: ACTIVE | COMMUNITY
Start: 2023-09-18 | End: 1900-01-01

## 2023-09-22 ENCOUNTER — APPOINTMENT (OUTPATIENT)
Dept: ENDOCRINOLOGY | Facility: CLINIC | Age: 58
End: 2023-09-22
Payer: COMMERCIAL

## 2023-09-22 VITALS
HEART RATE: 90 BPM | OXYGEN SATURATION: 96 % | WEIGHT: 185 LBS | HEIGHT: 68 IN | BODY MASS INDEX: 28.04 KG/M2 | DIASTOLIC BLOOD PRESSURE: 82 MMHG | SYSTOLIC BLOOD PRESSURE: 140 MMHG

## 2023-09-22 PROCEDURE — 99214 OFFICE O/P EST MOD 30 MIN: CPT | Mod: 25

## 2023-09-22 RX ORDER — SEMAGLUTIDE 0.68 MG/ML
2 INJECTION, SOLUTION SUBCUTANEOUS
Qty: 1 | Refills: 2 | Status: DISCONTINUED | COMMUNITY
Start: 2023-04-24 | End: 2023-09-22

## 2024-01-10 ENCOUNTER — APPOINTMENT (OUTPATIENT)
Dept: OPHTHALMOLOGY | Facility: CLINIC | Age: 59
End: 2024-01-10
Payer: COMMERCIAL

## 2024-01-10 ENCOUNTER — NON-APPOINTMENT (OUTPATIENT)
Age: 59
End: 2024-01-10

## 2024-01-10 PROCEDURE — 92250 FUNDUS PHOTOGRAPHY W/I&R: CPT

## 2024-01-10 PROCEDURE — 92014 COMPRE OPH EXAM EST PT 1/>: CPT

## 2024-03-29 ENCOUNTER — APPOINTMENT (OUTPATIENT)
Dept: ENDOCRINOLOGY | Facility: CLINIC | Age: 59
End: 2024-03-29
Payer: COMMERCIAL

## 2024-03-29 VITALS
DIASTOLIC BLOOD PRESSURE: 80 MMHG | BODY MASS INDEX: 27.07 KG/M2 | WEIGHT: 178 LBS | HEART RATE: 91 BPM | OXYGEN SATURATION: 98 % | SYSTOLIC BLOOD PRESSURE: 126 MMHG

## 2024-03-29 DIAGNOSIS — E11.65 TYPE 2 DIABETES MELLITUS WITH HYPERGLYCEMIA: ICD-10-CM

## 2024-03-29 DIAGNOSIS — E78.5 HYPERLIPIDEMIA, UNSPECIFIED: ICD-10-CM

## 2024-03-29 DIAGNOSIS — I10 ESSENTIAL (PRIMARY) HYPERTENSION: ICD-10-CM

## 2024-03-29 LAB — HBA1C MFR BLD HPLC: 5.5

## 2024-03-29 PROCEDURE — 99214 OFFICE O/P EST MOD 30 MIN: CPT

## 2024-03-29 PROCEDURE — 83036 HEMOGLOBIN GLYCOSYLATED A1C: CPT | Mod: QW

## 2024-03-29 RX ORDER — GLIPIZIDE 5 MG/1
5 TABLET, FILM COATED, EXTENDED RELEASE ORAL DAILY
Qty: 180 | Refills: 2 | Status: ACTIVE | COMMUNITY
Start: 2024-01-11 | End: 1900-01-01

## 2024-03-29 RX ORDER — TIRZEPATIDE 2.5 MG/.5ML
2.5 INJECTION, SOLUTION SUBCUTANEOUS
Qty: 3 | Refills: 3 | Status: ACTIVE | COMMUNITY
Start: 2024-03-29 | End: 1900-01-01

## 2024-03-29 NOTE — ASSESSMENT
[FreeTextEntry1] : Patient is a 58 M with history of HTN, sarcoidosis, chronic uveitis, glaucoma, s/p bilateral cataract surgery here for T2D follow up.    # T2D  - HgB A1C: 6.2-->5.5 - Complications: neuropathy, erectile dysfunction - Aspirin: none - Most recent urine microalbumin  98 04/2023    . ACE-I/ARB: yes on losartan  - Most recent LDL: 130 repeat today   . On statin: yes - Opthalmology up to date: yes,  advised for yearly check up - Podiatry up to date: yes advised for yearly check up - Patient to call for persistent glucose < 70 or > 300 - Patient counseled on the importance of consistent carbohydrate diet and regular physical activity  - Advised patient to continue checking FSG and to bring meter to all visits  - Symptoms of hypoglycemia discussed - Medications changes: - COnitnue with metformin 1 g BID - Continue with Glipizide 10 mg daily  - Will try to obtain mounjaro vs trulicity for weight loss   - When started on GLP1RA, can stop Glipizide   # HTN - BP today 126/82 - Continue with losartan 100 mg daily and amlodipine 10 mg daily   # HLD - Most recent , repeat lipid profile  - Previously on lipitor, stopped when cholesterol improved - Patient wants to work on lifestyle modification at this time and does not wish to start statin - We discussed that moderate intensity statin would be beneficial for CV protection. Will repeat lipid profile at next visit, if remains elevated, then will start statin  FOLLOW UP: I recommend that next visit for diabetes care be in 6 month  To do at next clinic visit - Titrate  GLP1RA   Sarah Miner MD Endocrinology, Diabetes and Metabolism [address] [place] [address]. [address] [place] [address] Tel [phone] Fax [phone]

## 2024-03-29 NOTE — HISTORY OF PRESENT ILLNESS
[FreeTextEntry1] : Patient is a 58 M with history of HTN, sarcoidosis, chronic uveitis, glaucoma, s/p bilateral cataract surgery here for T2D follow up. 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 +Bilateral LE neuropathy, on Lyrica and vitamin B12. Denies h/o foot ulcers or sores. Retinopathy: Last ophthalmology visit 3/2023. No diabetic retinopathy per ophtho report. Denies blurry vision. +Cataracts s/p bilateral cataract surgery (3/2021 and 2022). +Chronic uveitis from sarcoidosis, +glaucoma, reportedly stable. Nephropathy:  eGFR 99 (2023) , ACR  98 2023 CAD/MI/CVA: none DKA: none  Has erectile dysfunction   Current DM medications:  - metformin ER 1000 mg BID (increased gradually from 500 mg BID in 3/2022, tolerating well now) - glipizide ER 10 mg daily  (started by PCP, tolerating well) - Trulicity 1.5 mg weekly have not been able to get it for the past 2 weeks   Past DM medications:  - Tradjenta did not sit well  - Janumet did not have insurance coverage - Actos, told by previous endocrinologist that he didnt need it  - Farxiga had stomach upset   Finger sticks:  He tries to eat before midnight Fastin-89, 115, 127, 128, 90 Before dinner: 126-145, 172, 142, 116, 135  Diet Current Diet: 2 meals a day usually, started craving refined carbs again later this year breakfast (9-10am)- sometimes, cheerios medley crunch with berries, 2 boiled eggs, sausage, tuna or egg salad with ww bread or crackers, sometimes salad, sometimes PB&J, no toast anymore snack (2pm)- apple or salad, coffee with creamer; less butter roll, bagel, and pizza now dinner (eats dinner late 10-11pm)- soup, chicken or fried chicken, steamed vegetable, sometimes brown rice, no bread, rare ww pasta snacks- sometimes cookies, fruit (apple or orange)- fruit helped with neuropathy drinks- mostly water, unsweetened ice tea, stopped soda, rarely juice Exercise: limited, mostly active at work, walking, would like to go back to gym  # HTN - BP today 126/80, at home 133/79 - On losartan 100 mg daily and amlodipine 10 mg daily   # HLD - Recent , HDL 35,  2023 - Previously on lipitor, stopped when cholesterol improved   2023 visit events: overall doing well, at night time having feet numbness. Also having R finger numbness  3/29/2024 visit events: overall doing well has not been able to get trulicity

## 2024-04-01 LAB
ALBUMIN SERPL ELPH-MCNC: 4.4 G/DL
ALP BLD-CCNC: 81 U/L
ALT SERPL-CCNC: 17 U/L
ANION GAP SERPL CALC-SCNC: 11 MMOL/L
AST SERPL-CCNC: 21 U/L
BILIRUB SERPL-MCNC: 0.5 MG/DL
BUN SERPL-MCNC: 18 MG/DL
CALCIUM SERPL-MCNC: 9.3 MG/DL
CHLORIDE SERPL-SCNC: 109 MMOL/L
CHOLEST SERPL-MCNC: 192 MG/DL
CO2 SERPL-SCNC: 20 MMOL/L
CREAT SERPL-MCNC: 1.11 MG/DL
CREAT SPEC-SCNC: 88 MG/DL
EGFR: 76 ML/MIN/1.73M2
GLUCOSE SERPL-MCNC: 169 MG/DL
HDLC SERPL-MCNC: 33 MG/DL
LDLC SERPL CALC-MCNC: 114 MG/DL
MICROALBUMIN 24H UR DL<=1MG/L-MCNC: 5.1 MG/DL
MICROALBUMIN/CREAT 24H UR-RTO: 57 MG/G
NONHDLC SERPL-MCNC: 159 MG/DL
POTASSIUM SERPL-SCNC: 4.1 MMOL/L
PROT SERPL-MCNC: 7.5 G/DL
SODIUM SERPL-SCNC: 140 MMOL/L
TRIGL SERPL-MCNC: 257 MG/DL
VIT B12 SERPL-MCNC: 322 PG/ML

## 2024-04-01 RX ORDER — DULAGLUTIDE 3 MG/.5ML
3 INJECTION, SOLUTION SUBCUTANEOUS
Qty: 1 | Refills: 3 | Status: ACTIVE | COMMUNITY
Start: 2023-07-05 | End: 1900-01-01

## 2024-04-02 RX ORDER — ATORVASTATIN CALCIUM 20 MG/1
20 TABLET, FILM COATED ORAL DAILY
Qty: 90 | Refills: 3 | Status: DISCONTINUED | COMMUNITY
Start: 2024-04-01 | End: 2024-04-02

## 2024-04-02 RX ORDER — ROSUVASTATIN CALCIUM 5 MG/1
5 TABLET, FILM COATED ORAL DAILY
Qty: 90 | Refills: 3 | Status: ACTIVE | COMMUNITY
Start: 2024-04-02 | End: 1900-01-01

## 2024-04-08 ENCOUNTER — APPOINTMENT (OUTPATIENT)
Dept: OPHTHALMOLOGY | Facility: CLINIC | Age: 59
End: 2024-04-08
Payer: COMMERCIAL

## 2024-04-08 ENCOUNTER — NON-APPOINTMENT (OUTPATIENT)
Age: 59
End: 2024-04-08

## 2024-04-08 PROCEDURE — 92014 COMPRE OPH EXAM EST PT 1/>: CPT

## 2024-04-08 PROCEDURE — 92250 FUNDUS PHOTOGRAPHY W/I&R: CPT

## 2024-05-22 RX ORDER — DULAGLUTIDE 1.5 MG/.5ML
1.5 INJECTION, SOLUTION SUBCUTANEOUS
Qty: 1 | Refills: 1 | Status: ACTIVE | COMMUNITY
Start: 2024-05-22 | End: 1900-01-01

## 2024-05-28 DIAGNOSIS — R74.8 ABNORMAL LEVELS OF OTHER SERUM ENZYMES: ICD-10-CM

## 2024-05-28 LAB
ALBUMIN SERPL ELPH-MCNC: 4.6 G/DL
ALP BLD-CCNC: 92 U/L
ALT SERPL-CCNC: 22 U/L
ANION GAP SERPL CALC-SCNC: 14 MMOL/L
AST SERPL-CCNC: 19 U/L
BILIRUB SERPL-MCNC: 0.4 MG/DL
BUN SERPL-MCNC: 11 MG/DL
CALCIUM SERPL-MCNC: 9.4 MG/DL
CHLORIDE SERPL-SCNC: 108 MMOL/L
CO2 SERPL-SCNC: 19 MMOL/L
CREAT SERPL-MCNC: 1 MG/DL
EGFR: 87 ML/MIN/1.73M2
GLUCOSE SERPL-MCNC: 86 MG/DL
LPL SERPL-CCNC: 148 U/L
POTASSIUM SERPL-SCNC: 4.1 MMOL/L
PROT SERPL-MCNC: 7.7 G/DL
SODIUM SERPL-SCNC: 141 MMOL/L

## 2024-06-12 ENCOUNTER — APPOINTMENT (OUTPATIENT)
Dept: CT IMAGING | Facility: CLINIC | Age: 59
End: 2024-06-12

## 2024-06-19 ENCOUNTER — OUTPATIENT (OUTPATIENT)
Dept: OUTPATIENT SERVICES | Facility: HOSPITAL | Age: 59
LOS: 1 days | End: 2024-06-19
Payer: COMMERCIAL

## 2024-06-19 ENCOUNTER — APPOINTMENT (OUTPATIENT)
Dept: CT IMAGING | Facility: CLINIC | Age: 59
End: 2024-06-19
Payer: COMMERCIAL

## 2024-06-19 DIAGNOSIS — R74.8 ABNORMAL LEVELS OF OTHER SERUM ENZYMES: ICD-10-CM

## 2024-06-19 PROCEDURE — 74177 CT ABD & PELVIS W/CONTRAST: CPT | Mod: 26

## 2024-06-19 PROCEDURE — 74177 CT ABD & PELVIS W/CONTRAST: CPT

## 2024-07-01 ENCOUNTER — RX RENEWAL (OUTPATIENT)
Age: 59
End: 2024-07-01

## 2024-08-07 ENCOUNTER — APPOINTMENT (OUTPATIENT)
Dept: OPHTHALMOLOGY | Facility: CLINIC | Age: 59
End: 2024-08-07

## 2024-09-10 ENCOUNTER — APPOINTMENT (OUTPATIENT)
Dept: GASTROENTEROLOGY | Facility: CLINIC | Age: 59
End: 2024-09-10

## 2024-10-04 ENCOUNTER — APPOINTMENT (OUTPATIENT)
Dept: ENDOCRINOLOGY | Facility: CLINIC | Age: 59
End: 2024-10-04

## 2024-10-30 ENCOUNTER — APPOINTMENT (OUTPATIENT)
Dept: ENDOCRINOLOGY | Facility: CLINIC | Age: 59
End: 2024-10-30
Payer: COMMERCIAL

## 2024-10-30 VITALS
OXYGEN SATURATION: 98 % | HEIGHT: 68 IN | SYSTOLIC BLOOD PRESSURE: 136 MMHG | HEART RATE: 87 BPM | BODY MASS INDEX: 28.64 KG/M2 | DIASTOLIC BLOOD PRESSURE: 80 MMHG | WEIGHT: 189 LBS

## 2024-10-30 DIAGNOSIS — E78.5 HYPERLIPIDEMIA, UNSPECIFIED: ICD-10-CM

## 2024-10-30 DIAGNOSIS — I10 ESSENTIAL (PRIMARY) HYPERTENSION: ICD-10-CM

## 2024-10-30 DIAGNOSIS — E11.65 TYPE 2 DIABETES MELLITUS WITH HYPERGLYCEMIA: ICD-10-CM

## 2024-10-30 LAB — HBA1C MFR BLD HPLC: 6.6

## 2024-10-30 PROCEDURE — 83036 HEMOGLOBIN GLYCOSYLATED A1C: CPT | Mod: QW

## 2024-10-30 PROCEDURE — 99214 OFFICE O/P EST MOD 30 MIN: CPT

## 2024-10-30 PROCEDURE — G2211 COMPLEX E/M VISIT ADD ON: CPT | Mod: NC

## 2024-10-30 RX ORDER — DULAGLUTIDE 0.75 MG/.5ML
0.75 INJECTION, SOLUTION SUBCUTANEOUS
Qty: 1 | Refills: 2 | Status: ACTIVE | COMMUNITY
Start: 2024-10-30 | End: 1900-01-01

## 2024-10-31 ENCOUNTER — APPOINTMENT (OUTPATIENT)
Dept: OPHTHALMOLOGY | Facility: CLINIC | Age: 59
End: 2024-10-31
Payer: COMMERCIAL

## 2024-10-31 ENCOUNTER — NON-APPOINTMENT (OUTPATIENT)
Age: 59
End: 2024-10-31

## 2024-10-31 PROCEDURE — 92083 EXTENDED VISUAL FIELD XM: CPT

## 2024-10-31 PROCEDURE — 92012 INTRM OPH EXAM EST PATIENT: CPT

## 2024-10-31 PROCEDURE — 92133 CPTRZD OPH DX IMG PST SGM ON: CPT

## 2024-11-06 LAB
ALBUMIN SERPL ELPH-MCNC: 4.4 G/DL
ALP BLD-CCNC: 103 U/L
ALT SERPL-CCNC: 18 U/L
ANION GAP SERPL CALC-SCNC: 14 MMOL/L
AST SERPL-CCNC: 16 U/L
BILIRUB SERPL-MCNC: 0.5 MG/DL
BUN SERPL-MCNC: 16 MG/DL
CALCIUM SERPL-MCNC: 9.7 MG/DL
CHLORIDE SERPL-SCNC: 107 MMOL/L
CHOLEST SERPL-MCNC: 205 MG/DL
CO2 SERPL-SCNC: 21 MMOL/L
CREAT SERPL-MCNC: 1.12 MG/DL
CREAT SPEC-SCNC: 87 MG/DL
EGFR: 76 ML/MIN/1.73M2
GLUCOSE SERPL-MCNC: 193 MG/DL
HDLC SERPL-MCNC: 36 MG/DL
LDLC SERPL CALC-MCNC: 120 MG/DL
MICROALBUMIN 24H UR DL<=1MG/L-MCNC: 7.3 MG/DL
MICROALBUMIN/CREAT 24H UR-RTO: 84 MG/G
NONHDLC SERPL-MCNC: 169 MG/DL
POTASSIUM SERPL-SCNC: 4 MMOL/L
PROT SERPL-MCNC: 7.7 G/DL
SODIUM SERPL-SCNC: 141 MMOL/L
TRIGL SERPL-MCNC: 278 MG/DL
TSH SERPL-ACNC: 2.94 UIU/ML
VIT B12 SERPL-MCNC: 363 PG/ML

## 2024-11-15 ENCOUNTER — NON-APPOINTMENT (OUTPATIENT)
Age: 59
End: 2024-11-15

## 2024-11-15 ENCOUNTER — APPOINTMENT (OUTPATIENT)
Dept: CARDIOLOGY | Facility: CLINIC | Age: 59
End: 2024-11-15
Payer: COMMERCIAL

## 2024-11-15 VITALS
WEIGHT: 187 LBS | SYSTOLIC BLOOD PRESSURE: 130 MMHG | HEART RATE: 85 BPM | OXYGEN SATURATION: 99 % | HEIGHT: 68 IN | BODY MASS INDEX: 28.34 KG/M2 | DIASTOLIC BLOOD PRESSURE: 80 MMHG

## 2024-11-15 DIAGNOSIS — I10 ESSENTIAL (PRIMARY) HYPERTENSION: ICD-10-CM

## 2024-11-15 DIAGNOSIS — I35.8 OTHER NONRHEUMATIC AORTIC VALVE DISORDERS: ICD-10-CM

## 2024-11-15 DIAGNOSIS — D86.9 SARCOIDOSIS, UNSPECIFIED: ICD-10-CM

## 2024-11-15 DIAGNOSIS — R94.31 ABNORMAL ELECTROCARDIOGRAM [ECG] [EKG]: ICD-10-CM

## 2024-11-15 DIAGNOSIS — E78.5 HYPERLIPIDEMIA, UNSPECIFIED: ICD-10-CM

## 2024-11-15 PROCEDURE — 93000 ELECTROCARDIOGRAM COMPLETE: CPT

## 2024-11-15 PROCEDURE — 99204 OFFICE O/P NEW MOD 45 MIN: CPT | Mod: 25

## 2024-11-15 RX ORDER — PREGABALIN 75 MG/1
75 CAPSULE ORAL TWICE DAILY
Refills: 0 | Status: ACTIVE | COMMUNITY
Start: 2024-11-15

## 2024-11-15 RX ORDER — AMLODIPINE BESYLATE 10 MG/1
10 TABLET ORAL
Refills: 0 | Status: ACTIVE | COMMUNITY

## 2024-11-15 RX ORDER — DORZOLAMIDE HYDROCHLORIDE TIMOLOL MALEATE 20; 5 MG/ML; MG/ML
2-0.5 SOLUTION/ DROPS OPHTHALMIC
Refills: 0 | Status: ACTIVE | COMMUNITY
Start: 2024-11-15

## 2024-11-15 RX ORDER — BRIMONIDINE TARTRATE 2 MG/MG
0.2 SOLUTION/ DROPS OPHTHALMIC
Refills: 0 | Status: ACTIVE | COMMUNITY
Start: 2024-11-15

## 2024-11-15 RX ORDER — LOSARTAN POTASSIUM 100 MG/1
100 TABLET, FILM COATED ORAL
Refills: 0 | Status: ACTIVE | COMMUNITY

## 2024-11-26 ENCOUNTER — APPOINTMENT (OUTPATIENT)
Dept: CARDIOLOGY | Facility: CLINIC | Age: 59
End: 2024-11-26

## 2024-11-26 PROCEDURE — 93015 CV STRESS TEST SUPVJ I&R: CPT

## 2024-11-27 ENCOUNTER — NON-APPOINTMENT (OUTPATIENT)
Age: 59
End: 2024-11-27

## 2024-11-27 DIAGNOSIS — R94.39 ABNORMAL RESULT OF OTHER CARDIOVASCULAR FUNCTION STUDY: ICD-10-CM

## 2024-12-05 ENCOUNTER — APPOINTMENT (OUTPATIENT)
Dept: CARDIOLOGY | Facility: CLINIC | Age: 59
End: 2024-12-05

## 2024-12-12 ENCOUNTER — APPOINTMENT (OUTPATIENT)
Dept: CARDIOLOGY | Facility: CLINIC | Age: 59
End: 2024-12-12

## 2024-12-12 PROCEDURE — A9500: CPT

## 2024-12-12 PROCEDURE — 78452 HT MUSCLE IMAGE SPECT MULT: CPT

## 2024-12-12 PROCEDURE — 93015 CV STRESS TEST SUPVJ I&R: CPT

## 2024-12-20 ENCOUNTER — APPOINTMENT (OUTPATIENT)
Dept: CARDIOLOGY | Facility: CLINIC | Age: 59
End: 2024-12-20

## 2024-12-20 ENCOUNTER — NON-APPOINTMENT (OUTPATIENT)
Age: 59
End: 2024-12-20

## 2024-12-20 VITALS
RESPIRATION RATE: 18 BRPM | DIASTOLIC BLOOD PRESSURE: 72 MMHG | BODY MASS INDEX: 28.95 KG/M2 | SYSTOLIC BLOOD PRESSURE: 128 MMHG | HEART RATE: 90 BPM | WEIGHT: 191 LBS | OXYGEN SATURATION: 98 % | HEIGHT: 68 IN

## 2024-12-20 DIAGNOSIS — R94.39 ABNORMAL RESULT OF OTHER CARDIOVASCULAR FUNCTION STUDY: ICD-10-CM

## 2024-12-20 DIAGNOSIS — E78.5 HYPERLIPIDEMIA, UNSPECIFIED: ICD-10-CM

## 2024-12-20 DIAGNOSIS — D86.9 SARCOIDOSIS, UNSPECIFIED: ICD-10-CM

## 2024-12-20 DIAGNOSIS — I10 ESSENTIAL (PRIMARY) HYPERTENSION: ICD-10-CM

## 2024-12-20 PROCEDURE — G2211 COMPLEX E/M VISIT ADD ON: CPT | Mod: NC

## 2024-12-20 PROCEDURE — 99214 OFFICE O/P EST MOD 30 MIN: CPT

## 2025-01-08 ENCOUNTER — APPOINTMENT (OUTPATIENT)
Dept: CARDIOLOGY | Facility: CLINIC | Age: 60
End: 2025-01-08
Payer: COMMERCIAL

## 2025-01-08 PROCEDURE — 93306 TTE W/DOPPLER COMPLETE: CPT

## 2025-01-15 ENCOUNTER — APPOINTMENT (OUTPATIENT)
Dept: CARDIOLOGY | Facility: CLINIC | Age: 60
End: 2025-01-15

## 2025-01-15 ENCOUNTER — NON-APPOINTMENT (OUTPATIENT)
Age: 60
End: 2025-01-15

## 2025-01-15 VITALS
OXYGEN SATURATION: 98 % | WEIGHT: 190.38 LBS | BODY MASS INDEX: 28.85 KG/M2 | HEIGHT: 68 IN | SYSTOLIC BLOOD PRESSURE: 124 MMHG | HEART RATE: 90 BPM | DIASTOLIC BLOOD PRESSURE: 62 MMHG | RESPIRATION RATE: 14 BRPM

## 2025-01-15 DIAGNOSIS — R94.31 ABNORMAL ELECTROCARDIOGRAM [ECG] [EKG]: ICD-10-CM

## 2025-01-15 DIAGNOSIS — I10 ESSENTIAL (PRIMARY) HYPERTENSION: ICD-10-CM

## 2025-01-15 DIAGNOSIS — E78.5 HYPERLIPIDEMIA, UNSPECIFIED: ICD-10-CM

## 2025-01-15 DIAGNOSIS — D86.9 SARCOIDOSIS, UNSPECIFIED: ICD-10-CM

## 2025-01-15 DIAGNOSIS — E11.9 TYPE 2 DIABETES MELLITUS W/OUT COMPLICATIONS: ICD-10-CM

## 2025-01-15 DIAGNOSIS — R94.30 ABNORMAL RESULT OF CARDIOVASCULAR FUNCTION STUDY, UNSPECIFIED: ICD-10-CM

## 2025-01-15 PROCEDURE — 99214 OFFICE O/P EST MOD 30 MIN: CPT

## 2025-01-15 RX ORDER — METOPROLOL SUCCINATE 25 MG/1
25 TABLET, EXTENDED RELEASE ORAL
Qty: 90 | Refills: 2 | Status: ACTIVE | COMMUNITY
Start: 2025-01-15 | End: 1900-01-01

## 2025-01-30 ENCOUNTER — RX RENEWAL (OUTPATIENT)
Age: 60
End: 2025-01-30

## 2025-02-13 ENCOUNTER — RESULT REVIEW (OUTPATIENT)
Age: 60
End: 2025-02-13

## 2025-02-13 ENCOUNTER — APPOINTMENT (OUTPATIENT)
Dept: MRI IMAGING | Facility: CLINIC | Age: 60
End: 2025-02-13
Payer: COMMERCIAL

## 2025-02-13 ENCOUNTER — OUTPATIENT (OUTPATIENT)
Dept: OUTPATIENT SERVICES | Facility: HOSPITAL | Age: 60
LOS: 1 days | End: 2025-02-13

## 2025-02-13 DIAGNOSIS — D86.9 SARCOIDOSIS, UNSPECIFIED: ICD-10-CM

## 2025-02-13 PROCEDURE — 75565 CARD MRI VELOC FLOW MAPPING: CPT | Mod: 26

## 2025-02-13 PROCEDURE — 75561 CARDIAC MRI FOR MORPH W/DYE: CPT | Mod: 26

## 2025-02-18 DIAGNOSIS — D86.85 SARCOID MYOCARDITIS: ICD-10-CM

## 2025-03-05 ENCOUNTER — APPOINTMENT (OUTPATIENT)
Dept: ENDOCRINOLOGY | Facility: CLINIC | Age: 60
End: 2025-03-05
Payer: COMMERCIAL

## 2025-03-05 VITALS
DIASTOLIC BLOOD PRESSURE: 60 MMHG | OXYGEN SATURATION: 99 % | HEART RATE: 87 BPM | BODY MASS INDEX: 28.95 KG/M2 | SYSTOLIC BLOOD PRESSURE: 126 MMHG | HEIGHT: 68 IN | WEIGHT: 191 LBS

## 2025-03-05 DIAGNOSIS — E11.9 TYPE 2 DIABETES MELLITUS W/OUT COMPLICATIONS: ICD-10-CM

## 2025-03-05 DIAGNOSIS — E78.5 HYPERLIPIDEMIA, UNSPECIFIED: ICD-10-CM

## 2025-03-05 DIAGNOSIS — E11.65 TYPE 2 DIABETES MELLITUS WITH HYPERGLYCEMIA: ICD-10-CM

## 2025-03-05 DIAGNOSIS — I10 ESSENTIAL (PRIMARY) HYPERTENSION: ICD-10-CM

## 2025-03-05 LAB — HBA1C MFR BLD HPLC: 6

## 2025-03-05 PROCEDURE — G2211 COMPLEX E/M VISIT ADD ON: CPT | Mod: NC

## 2025-03-05 PROCEDURE — 83036 HEMOGLOBIN GLYCOSYLATED A1C: CPT | Mod: QW

## 2025-03-05 PROCEDURE — 99214 OFFICE O/P EST MOD 30 MIN: CPT

## 2025-03-06 LAB
25(OH)D3 SERPL-MCNC: 30.1 NG/ML
ALBUMIN SERPL ELPH-MCNC: 4.6 G/DL
ALP BLD-CCNC: 89 U/L
ALT SERPL-CCNC: 16 U/L
ANION GAP SERPL CALC-SCNC: 13 MMOL/L
AST SERPL-CCNC: 17 U/L
BILIRUB SERPL-MCNC: 0.3 MG/DL
BUN SERPL-MCNC: 18 MG/DL
CALCIUM SERPL-MCNC: 9.1 MG/DL
CHLORIDE SERPL-SCNC: 106 MMOL/L
CHOLEST SERPL-MCNC: 164 MG/DL
CO2 SERPL-SCNC: 20 MMOL/L
CREAT SERPL-MCNC: 1.38 MG/DL
CREAT SPEC-SCNC: 140 MG/DL
EGFRCR SERPLBLD CKD-EPI 2021: 59 ML/MIN/1.73M2
GLUCOSE SERPL-MCNC: 84 MG/DL
HDLC SERPL-MCNC: 36 MG/DL
LDLC SERPL CALC-MCNC: 101 MG/DL
MICROALBUMIN 24H UR DL<=1MG/L-MCNC: 4.1 MG/DL
MICROALBUMIN/CREAT 24H UR-RTO: 30 MG/G
NONHDLC SERPL-MCNC: 129 MG/DL
POTASSIUM SERPL-SCNC: 4.2 MMOL/L
PROT SERPL-MCNC: 7.6 G/DL
SODIUM SERPL-SCNC: 139 MMOL/L
T4 FREE SERPL-MCNC: 1.2 NG/DL
TRIGL SERPL-MCNC: 158 MG/DL
TSH SERPL-ACNC: 1.78 UIU/ML
VIT B12 SERPL-MCNC: 386 PG/ML

## 2025-03-18 ENCOUNTER — APPOINTMENT (OUTPATIENT)
Dept: CARDIOLOGY | Facility: CLINIC | Age: 60
End: 2025-03-18
Payer: COMMERCIAL

## 2025-03-18 VITALS
HEART RATE: 90 BPM | WEIGHT: 195 LBS | RESPIRATION RATE: 16 BRPM | BODY MASS INDEX: 29.55 KG/M2 | DIASTOLIC BLOOD PRESSURE: 76 MMHG | SYSTOLIC BLOOD PRESSURE: 136 MMHG | HEIGHT: 68 IN | OXYGEN SATURATION: 96 %

## 2025-03-18 DIAGNOSIS — E78.5 HYPERLIPIDEMIA, UNSPECIFIED: ICD-10-CM

## 2025-03-18 DIAGNOSIS — I42.8 OTHER CARDIOMYOPATHIES: ICD-10-CM

## 2025-03-18 DIAGNOSIS — I10 ESSENTIAL (PRIMARY) HYPERTENSION: ICD-10-CM

## 2025-03-18 DIAGNOSIS — D86.85 SARCOID MYOCARDITIS: ICD-10-CM

## 2025-03-18 DIAGNOSIS — E11.9 TYPE 2 DIABETES MELLITUS W/OUT COMPLICATIONS: ICD-10-CM

## 2025-03-18 PROCEDURE — G2211 COMPLEX E/M VISIT ADD ON: CPT | Mod: NC

## 2025-03-18 PROCEDURE — 99214 OFFICE O/P EST MOD 30 MIN: CPT

## 2025-03-18 RX ORDER — METOPROLOL SUCCINATE 25 MG/1
25 TABLET, EXTENDED RELEASE ORAL
Qty: 90 | Refills: 2 | Status: ACTIVE | COMMUNITY
Start: 2025-03-18 | End: 1900-01-01

## 2025-03-26 ENCOUNTER — APPOINTMENT (OUTPATIENT)
Dept: HEART FAILURE | Facility: CLINIC | Age: 60
End: 2025-03-26

## 2025-04-15 ENCOUNTER — NON-APPOINTMENT (OUTPATIENT)
Age: 60
End: 2025-04-15

## 2025-04-15 ENCOUNTER — APPOINTMENT (OUTPATIENT)
Dept: OPHTHALMOLOGY | Facility: CLINIC | Age: 60
End: 2025-04-15
Payer: COMMERCIAL

## 2025-04-15 PROCEDURE — 92083 EXTENDED VISUAL FIELD XM: CPT

## 2025-04-15 PROCEDURE — 92012 INTRM OPH EXAM EST PATIENT: CPT

## 2025-04-22 ENCOUNTER — NON-APPOINTMENT (OUTPATIENT)
Age: 60
End: 2025-04-22

## 2025-06-27 ENCOUNTER — APPOINTMENT (OUTPATIENT)
Dept: ENDOCRINOLOGY | Facility: CLINIC | Age: 60
End: 2025-06-27
Payer: COMMERCIAL

## 2025-06-27 VITALS
OXYGEN SATURATION: 96 % | SYSTOLIC BLOOD PRESSURE: 132 MMHG | BODY MASS INDEX: 28.95 KG/M2 | DIASTOLIC BLOOD PRESSURE: 78 MMHG | HEIGHT: 68 IN | HEART RATE: 87 BPM | WEIGHT: 191 LBS

## 2025-06-27 LAB
GLUCOSE BLDC GLUCOMTR-MCNC: 155
HBA1C MFR BLD HPLC: 6.1

## 2025-06-27 PROCEDURE — 83036 HEMOGLOBIN GLYCOSYLATED A1C: CPT | Mod: QW

## 2025-06-27 PROCEDURE — 99214 OFFICE O/P EST MOD 30 MIN: CPT

## 2025-06-27 PROCEDURE — 82962 GLUCOSE BLOOD TEST: CPT
